# Patient Record
Sex: FEMALE | Race: BLACK OR AFRICAN AMERICAN | Employment: UNEMPLOYED | URBAN - METROPOLITAN AREA
[De-identification: names, ages, dates, MRNs, and addresses within clinical notes are randomized per-mention and may not be internally consistent; named-entity substitution may affect disease eponyms.]

---

## 2020-02-28 ENCOUNTER — HOSPITAL ENCOUNTER (EMERGENCY)
Facility: CLINIC | Age: 45
Discharge: HOME | End: 2020-02-28
Attending: EMERGENCY MEDICINE | Admitting: EMERGENCY MEDICINE
Payer: COMMERCIAL

## 2020-02-28 ENCOUNTER — APPOINTMENT (OUTPATIENT)
Dept: GENERAL RADIOLOGY | Facility: CLINIC | Age: 45
End: 2020-02-28
Attending: EMERGENCY MEDICINE
Payer: COMMERCIAL

## 2020-02-28 VITALS
TEMPERATURE: 97.9 F | HEIGHT: 66 IN | OXYGEN SATURATION: 99 % | WEIGHT: 208 LBS | BODY MASS INDEX: 33.43 KG/M2 | HEART RATE: 86 BPM | DIASTOLIC BLOOD PRESSURE: 94 MMHG | SYSTOLIC BLOOD PRESSURE: 122 MMHG

## 2020-02-28 DIAGNOSIS — S42.321A CLOSED DISPLACED TRANSVERSE FRACTURE OF SHAFT OF RIGHT HUMERUS, INITIAL ENCOUNTER: ICD-10-CM

## 2020-02-28 LAB — GLUCOSE BLDC GLUCOMTR-MCNC: 213 MG/DL (ref 70–99)

## 2020-02-28 PROCEDURE — 00000146 ZZHCL STATISTIC GLUCOSE BY METER IP

## 2020-02-28 PROCEDURE — 73060 X-RAY EXAM OF HUMERUS: CPT | Mod: RT

## 2020-02-28 PROCEDURE — 25000128 H RX IP 250 OP 636: Performed by: EMERGENCY MEDICINE

## 2020-02-28 PROCEDURE — 25000132 ZZH RX MED GY IP 250 OP 250 PS 637: Performed by: EMERGENCY MEDICINE

## 2020-02-28 PROCEDURE — 99284 EMERGENCY DEPT VISIT MOD MDM: CPT

## 2020-02-28 PROCEDURE — 68300005 ZZH TRAUMA EVALUATION W/O CC LEVEL III

## 2020-02-28 RX ORDER — MORPHINE SULFATE 15 MG/1
15-30 TABLET ORAL EVERY 6 HOURS PRN
Qty: 12 TABLET | Refills: 0 | Status: SHIPPED | OUTPATIENT
Start: 2020-02-28

## 2020-02-28 RX ORDER — ONDANSETRON 4 MG/1
4 TABLET, ORALLY DISINTEGRATING ORAL EVERY 6 HOURS PRN
Qty: 10 TABLET | Refills: 0 | Status: SHIPPED | OUTPATIENT
Start: 2020-02-28 | End: 2020-07-16

## 2020-02-28 RX ORDER — HYDROCODONE BITARTRATE AND ACETAMINOPHEN 5; 325 MG/1; MG/1
2 TABLET ORAL ONCE
Status: COMPLETED | OUTPATIENT
Start: 2020-02-28 | End: 2020-02-28

## 2020-02-28 RX ORDER — ONDANSETRON 4 MG/1
4 TABLET, ORALLY DISINTEGRATING ORAL ONCE
Status: COMPLETED | OUTPATIENT
Start: 2020-02-28 | End: 2020-02-28

## 2020-02-28 RX ADMIN — HYDROCODONE BITARTRATE AND ACETAMINOPHEN 2 TABLET: 5; 325 TABLET ORAL at 16:54

## 2020-02-28 RX ADMIN — ONDANSETRON 4 MG: 4 TABLET, ORALLY DISINTEGRATING ORAL at 16:54

## 2020-02-28 ASSESSMENT — ENCOUNTER SYMPTOMS: NAUSEA: 1

## 2020-02-28 ASSESSMENT — MIFFLIN-ST. JEOR: SCORE: 1610.23

## 2020-02-28 NOTE — ED AVS SNAPSHOT
Emergency Department  [address] [facility] [address]  Phone:  [phone]  Fax:  [phone]                                    Paula Moran   MRN: [id number]    Department:   Emergency Department   Date of Visit:  2/28/2020           After Visit Summary Signature Page    I have received my discharge instructions, and my questions have been answered. I have discussed any challenges I see with this plan with the nurse or doctor.    ..........................................................................................................................................  Patient/Patient Representative Signature      ..........................................................................................................................................  Patient Representative Print Name and Relationship to Patient    ..................................................               ................................................  Date                                   Time    ..........................................................................................................................................  Reviewed by Signature/Title    ...................................................              ..............................................  Date                                               Time          22EPIC Rev 08/18

## 2020-02-28 NOTE — DISCHARGE INSTRUCTIONS
Return to the emergency department or seek medical care as instructed if your symptoms fail to improve or significantly worsen.    Take Acetaminophen (aka Tylenol; 2 extra strength Tylenol every 6 hours as needed) and/or ibuprofen (aka Motrin/Advil, 600mg up to 4 times per day with food) as needed for symptom/pain relief; use as directed.    Can take immediate release morphine as needed for breakthrough pain; use as directed    Ice area of pain for 20 minutes four times per day for the next two days    Follow-up as indicated on page 1.  Maintain adequate hydration and get plenty of rest.

## 2020-02-28 NOTE — ED PROVIDER NOTES
"  History     Chief Complaint:  Fall    HPI   Paula Jorge Alberto Moran is a right hand dominant 45 year old female who presents after a fall. The patient tripped and fell, developing pain in her upper right arm. She did not hit her head or lose consciousness. Here, the patient also notes that she is nauseous.  Pain is significant worsened with palpation and movement.      Allergies:  No Known Allergies     Medications:    Lantus  Victoza  Fluoxetine  atorvastatin    Past Medical History:    Diabetes with neuropathy  Depression  Anxiety   GERD  Hyperlipidemia  Hypertension in pregnancy     Past Surgical History:    The patient does not have any pertinent past surgical history.     Family History:    No past pertinent family history.     Social History:  Tobacco use: negative   Alcohol use: negative   PCP: No primary care provider on file.     Review of Systems   Gastrointestinal: Positive for nausea.   Musculoskeletal:        Right arm pain    All other systems reviewed and are negative.    Physical Exam     Patient Vitals for the past 24 hrs:   BP Temp Temp src Pulse SpO2 Height Weight   02/28/20 1728 -- -- -- 68 99 % -- --   02/28/20 1659 (!) 153/100 97.9  F (36.6  C) Oral 83 99 % 1.676 m (5' 6\") 94.3 kg (208 lb)        Physical Exam  General: Patient in mild distress.  Alert and cooperative with exam. Normal mentation  HEENT: NC/AT. Conjunctiva without injection or scleral icterus. External ears normal.  Respiratory: Breathing comfortably on room air  CV: Normal rate, all extremities well perfused  GI:  Non-distended abdomen  Skin: Warm, dry, no rashes/open wounds on exposed skin  Musculoskeletal: RUE: CMS intact. She is bell her arm in a guarded position and notes greatest pain to mid-humerus with some overlying swelling. Compartments soft.   Neuro: Alert, answers questions appropriately. No gross motor deficits      Emergency Department Course     Imaging:  Radiology findings were communicated with the patient " who voiced understanding of the findings.    XR right humerus G/E 2 views:  There is a transverse fracture through the midshaft of the  right humerus with mild anterior displacement of the distal fragment.  No other significant displacement or angulation is seen, as per radiology.       Interventions:  1654 Norco 5-325 mg, 2 tablets PO   Zofran 4 mg PO ODT     Emergency Department Course:  Past medical records, nursing notes, and vitals reviewed.    1644 I performed an exam of the patient as documented above.     The patient was sent for a right humerus x-ray while in the emergency department, results above.     1728 Patient rechecked and updated.      1735 I consulted with Dr. Ortiz, ortho, regarding the patient's history and presentation here in the emergency department.     1739 Patient rechecked and updated.    1808 Patient rechecked and updated after brace was placed.     1818 Patient rechecked and updated.    I personally reviewed the imaging results with the Patient and answered all related questions prior to discharge. I prescribed morphine.      Impression & Plan     Medical Decision Making:  Paula Moran is a 45-year-old female presents with mid right humeral pain status post mechanical fall.  Patient's medical history and records were reviewed.  Initial consideration for, but not limited to, fracture, dislocation, soft tissue injury, among others.  Patient was provided Zofran and Lortab for pain and nausea control with noted improvement.  Imaging obtained and demonstrates transverse humerus fracture as noted above.  Case was briefly discussed with orthopedics who recommended Xiao brace and follow-up in clinic in ~1 week.  Patient was provided MSIR for breakthrough pain and discussed supportive care and return precautions.  Patient discharged home.  Remainder of trauma exam unremarkable.    Discharge Diagnosis:    ICD-10-CM    1. Closed displaced transverse fracture of shaft of right  humerus, initial encounter S42.321A      Disposition:  Discharged to home     Discharge Medications:  New Prescriptions    MORPHINE (MSIR) 15 MG IR TABLET    Take 1-2 tablets (15-30 mg) by mouth every 6 hours as needed for breakthrough pain       Scribe Disclosure:  I, Daphne Grover, am serving as a scribe at 4:44 PM on 2/28/2020 to document services personally performed by Matias Dorado DO based on my observations and the provider's statements to me.       Matias Dorado DO  02/29/20 0027

## 2020-06-24 ENCOUNTER — HOSPITAL ENCOUNTER (EMERGENCY)
Facility: CLINIC | Age: 45
Discharge: HOME | End: 2020-06-25
Attending: EMERGENCY MEDICINE | Admitting: EMERGENCY MEDICINE
Payer: COMMERCIAL

## 2020-06-24 ENCOUNTER — APPOINTMENT (OUTPATIENT)
Dept: GENERAL RADIOLOGY | Facility: CLINIC | Age: 45
End: 2020-06-24
Attending: EMERGENCY MEDICINE
Payer: COMMERCIAL

## 2020-06-24 DIAGNOSIS — R06.02 SHORTNESS OF BREATH: ICD-10-CM

## 2020-06-24 DIAGNOSIS — R07.9 CHEST PAIN, UNSPECIFIED TYPE: ICD-10-CM

## 2020-06-24 LAB
ALBUMIN SERPL-MCNC: 3.2 G/DL (ref 3.4–5)
ALP SERPL-CCNC: 112 U/L (ref 40–150)
ALT SERPL W P-5'-P-CCNC: 21 U/L (ref 0–50)
ANION GAP SERPL CALCULATED.3IONS-SCNC: 7 MMOL/L (ref 3–14)
AST SERPL W P-5'-P-CCNC: 16 U/L (ref 0–45)
BASOPHILS # BLD AUTO: 0 10E9/L (ref 0–0.2)
BASOPHILS NFR BLD AUTO: 0.1 %
BILIRUB SERPL-MCNC: 0.3 MG/DL (ref 0.2–1.3)
BUN SERPL-MCNC: 9 MG/DL (ref 7–30)
CALCIUM SERPL-MCNC: 9.2 MG/DL (ref 8.5–10.1)
CHLORIDE SERPL-SCNC: 105 MMOL/L (ref 94–109)
CO2 SERPL-SCNC: 25 MMOL/L (ref 20–32)
CREAT SERPL-MCNC: 0.45 MG/DL (ref 0.52–1.04)
D DIMER PPP FEU-MCNC: 0.3 UG/ML FEU (ref 0–0.5)
DIFFERENTIAL METHOD BLD: ABNORMAL
EOSINOPHIL # BLD AUTO: 0.2 10E9/L (ref 0–0.7)
EOSINOPHIL NFR BLD AUTO: 1.5 %
ERYTHROCYTE [DISTWIDTH] IN BLOOD BY AUTOMATED COUNT: 16.2 % (ref 10–15)
GFR SERPL CREATININE-BSD FRML MDRD: >90 ML/MIN/{1.73_M2}
GLUCOSE SERPL-MCNC: 120 MG/DL (ref 70–99)
HCT VFR BLD AUTO: 40.8 % (ref 35–47)
HGB BLD-MCNC: 13.3 G/DL (ref 11.7–15.7)
IMM GRANULOCYTES # BLD: 0 10E9/L (ref 0–0.4)
IMM GRANULOCYTES NFR BLD: 0.2 %
LYMPHOCYTES # BLD AUTO: 2.4 10E9/L (ref 0.8–5.3)
LYMPHOCYTES NFR BLD AUTO: 21.4 %
MCH RBC QN AUTO: 24.1 PG (ref 26.5–33)
MCHC RBC AUTO-ENTMCNC: 32.6 G/DL (ref 31.5–36.5)
MCV RBC AUTO: 74 FL (ref 78–100)
MONOCYTES # BLD AUTO: 0.6 10E9/L (ref 0–1.3)
MONOCYTES NFR BLD AUTO: 5.1 %
NEUTROPHILS # BLD AUTO: 7.9 10E9/L (ref 1.6–8.3)
NEUTROPHILS NFR BLD AUTO: 71.7 %
NRBC # BLD AUTO: 0 10*3/UL
NRBC BLD AUTO-RTO: 0 /100
NT-PROBNP SERPL-MCNC: 7 PG/ML (ref 0–450)
PLATELET # BLD AUTO: 276 10E9/L (ref 150–450)
POTASSIUM SERPL-SCNC: 3.4 MMOL/L (ref 3.4–5.3)
PROT SERPL-MCNC: 7.8 G/DL (ref 6.8–8.8)
RBC # BLD AUTO: 5.51 10E12/L (ref 3.8–5.2)
SODIUM SERPL-SCNC: 137 MMOL/L (ref 133–144)
TROPONIN I SERPL-MCNC: <0.015 UG/L (ref 0–0.04)
WBC # BLD AUTO: 11 10E9/L (ref 4–11)

## 2020-06-24 PROCEDURE — 85379 FIBRIN DEGRADATION QUANT: CPT | Performed by: EMERGENCY MEDICINE

## 2020-06-24 PROCEDURE — U0003 INFECTIOUS AGENT DETECTION BY NUCLEIC ACID (DNA OR RNA); SEVERE ACUTE RESPIRATORY SYNDROME CORONAVIRUS 2 (SARS-COV-2) (CORONAVIRUS DISEASE [COVID-19]), AMPLIFIED PROBE TECHNIQUE, MAKING USE OF HIGH THROUGHPUT TECHNOLOGIES AS DESCRIBED BY CMS-2020-01-R: HCPCS | Performed by: EMERGENCY MEDICINE

## 2020-06-24 PROCEDURE — 99285 EMERGENCY DEPT VISIT HI MDM: CPT | Mod: 25

## 2020-06-24 PROCEDURE — 84484 ASSAY OF TROPONIN QUANT: CPT | Performed by: EMERGENCY MEDICINE

## 2020-06-24 PROCEDURE — 71045 X-RAY EXAM CHEST 1 VIEW: CPT

## 2020-06-24 PROCEDURE — 83880 ASSAY OF NATRIURETIC PEPTIDE: CPT | Performed by: EMERGENCY MEDICINE

## 2020-06-24 PROCEDURE — 80053 COMPREHEN METABOLIC PANEL: CPT | Performed by: EMERGENCY MEDICINE

## 2020-06-24 PROCEDURE — 85025 COMPLETE CBC W/AUTO DIFF WBC: CPT | Performed by: EMERGENCY MEDICINE

## 2020-06-24 PROCEDURE — C9803 HOPD COVID-19 SPEC COLLECT: HCPCS

## 2020-06-24 PROCEDURE — 93005 ELECTROCARDIOGRAM TRACING: CPT

## 2020-06-24 SDOH — HEALTH STABILITY: MENTAL HEALTH: HOW OFTEN DO YOU HAVE A DRINK CONTAINING ALCOHOL?: NEVER

## 2020-06-24 ASSESSMENT — ENCOUNTER SYMPTOMS
NAUSEA: 1
DIARRHEA: 0
SHORTNESS OF BREATH: 1

## 2020-06-24 NOTE — LETTER
June 26, 2020        Paula Moran  [address] [facility] [id number]    This letter provides a written record that you were tested for COVID-19 on 6/24/20.       Your result was negative. This means that we didn t find the virus that causes COVID-19 in your sample. A test may show negative when you do actually have the virus. This can happen when the virus is in the early stages of infection, before you feel illness symptoms.    If you have symptoms   Stay home and away from others (self-isolate) until you meet ALL of the guidelines below:    You ve had no fever--and no medicine that reduces fever--for 3 full days (72 hours). And      Your other symptoms have gotten better. For example, your cough or breathing has improved. And     At least 10 days have passed since your symptoms started.    During this time:    Stay home. Don t go to work, school or anywhere else.     Stay in your own room, including for meals. Use your own bathroom if you can.    Stay away from others in your home. No hugging, kissing or shaking hands. No visitors.    Clean  high touch  surfaces often (doorknobs, counters, handles, etc.). Use a household cleaning spray or wipes. You can find a full list on the EPA website at www.epa.gov/pesticide-registration/list-n-disinfectants-use-against-sars-cov-2.    Cover your mouth and nose with a mask, tissue or washcloth to avoid spreading germs.    Wash your hands and face often with soap and water.    Going back to work  Check with your employer for any guidelines to follow for going back to work.    Employers: This document serves as formal notice that your employee tested negative for COVID-19, as of the testing date shown above.

## 2020-06-25 VITALS
OXYGEN SATURATION: 97 % | TEMPERATURE: 98.7 F | DIASTOLIC BLOOD PRESSURE: 81 MMHG | WEIGHT: 205 LBS | BODY MASS INDEX: 33.09 KG/M2 | RESPIRATION RATE: 16 BRPM | SYSTOLIC BLOOD PRESSURE: 128 MMHG | HEART RATE: 99 BPM

## 2020-06-25 LAB
SARS-COV-2 RNA SPEC QL NAA+PROBE: NOT DETECTED
SPECIMEN SOURCE: NORMAL

## 2020-06-25 NOTE — ED PROVIDER NOTES
History     Chief Complaint:  Chest Pain       HPI   Paula Moran is a 45 year old female with a history of poorly controlled type II diabetes and GERD who presents with shortness of breath and chest pain.  The patient states her symptoms have been present for the last 2 days.  She denies fever, cough, or sick contacts, and she has had no known COVID-19 exposures. The patient states that her pain has been present constantly since yesterday.  She describes it as sharp pain going through to her back.  It does not radiate anywhere else.  Nothing makes it worse or better.  The patient states her shortness of breath is worse with lying flat.  She denies taking hormones or birth control.  She denies smoking, personal or family history of PE or DVT, prolonged immobilization or travel.  Patient denies diarrhea.  She states she does have mild nausea as well.  Patient denies history of COPD, asthma, congestive heart failure, or chest pain episodes in the past.    Allergies:  NKDA     Medications:    Lantus  Novolog  Prozac  Jardiance  Victoza     Past Medical History:    Poorly controlled type II diabetes  Diabetic neuropathy  Hypertension in pregnancy  GERD  H. Pylori  Morbid obesity  Depression  Anemia    Past Surgical History:    The patient does not have any pertinent past surgical history.    Family History:    No past pertinent family history.    Social History:  Negative for tobacco use.  Negative for alcohol use.  Negative for drug use.    Marital Status:   [2]     Review of Systems   Respiratory: Positive for shortness of breath.    Cardiovascular: Positive for chest pain.   Gastrointestinal: Positive for nausea. Negative for diarrhea.   All other systems reviewed and are negative.        Physical Exam     Patient Vitals for the past 24 hrs:   BP Temp Temp src Pulse Heart Rate Resp SpO2 Weight   06/24/20 2300 -- -- -- -- 98 -- 97 % --   06/24/20 2200 -- -- -- -- 100 -- 98 % --   06/24/20 2115  131/88 -- -- -- 109 -- -- --   06/24/20 2114 131/88 98.7  F (37.1  C) Oral 109 -- 16 99 % 93 kg (205 lb)        Physical Exam  General: Patient is alert and normal appearing.  HEENT: Head atraumatic    Eyes: pupils equal and reactive. Conjunctiva clear   Nares: patent   Oropharynx: no lesions, uvula midline, no palatal draping, normal voice, no trismus  Neck: Supple without lymphadenopathy, no meningismus  Chest: Tachycardic but regular.   Lungs: Equal clear to auscultation with no wheeze or rales  Abdomen: Soft, non tender, nondistended, normal bowel sounds  Back: No costovertebral angle tenderness, no midline C, T or L spine tenderness  Neuro: Grossly nonfocal, normal speech, strength equal bilaterally, CN 2-12 intact  Extremities: No deformities, equal radial and DP pulses. No clubbing, cyanosis.  No edema  Skin: Warm and dry with no rash.       Emergency Department Course   ECG:  Indication: Chest pain  Time: 2108  Vent. Rate 117 bpm. VA interval 136. QRS duration 90. QT/QTc 330/460. P-R-T axis 40 22 14.  Sinus tachycardia. Otherwise normal ECG. Read time: 2117     Imaging:  Radiographic findings were communicated with the patient who voiced understanding of the findings.  XR Chest Portable 1 View:   Negative chest, as per radiology.      Laboratory:  CBC: WBC: 11.0, HGB: 13.3, PLT: 276  CMP: Glucose 120 (H), Creatinine 0.45 (L), Albumin 3.2 (L), o/w WNL  2131 Troponin: <0.015   D dimer: 0.3  BNP: 7    Emergency Department Course:  Past medical records, nursing notes, and vitals reviewed.     EKG obtained in the ED, see results above.     2111 I performed an exam of the patient as documented above.     IV inserted. Blood drawn. This was sent to the lab for further testing, results above.    The patient received a portable chest x-ray while in the emergency department, findings above.     2349 I rechecked the patient and discussed the results of her workup thus far. She told me that she was chest pain free and  her shortness of breath had resolved. We discussed the plan for discharge with an outpatient stress echocardiogram, and she felt comfortable going home.    Findings and plan explained to the Patient. Patient discharged home with instructions regarding supportive care, medications, and reasons to return. The importance of close follow-up was reviewed.     I personally reviewed the laboratory results with the Patient and answered all related questions prior to discharge.      Impression & Plan    Medical Decision Making:  Paula Moran is a 45 year old female who presents for evaluation of chest pain.  This pain has lasted for over 12 hours now. Initial laboratory and imaging tests have come back normal.  There has been no progression of symptoms or other new concerning symptoms.   Symptoms have completely resolved at this time.    A broad differential was of course considered.  Exceedingly low risk for unstable angina at this point and will therefore not admit formally and administer heparin.    Discussed HEART score with patient and shared decision making regarding disposition and further workup was done.      I have ordered the patient up for an outpatient stress echocardiogram within 72 hours.  The patient agrees with the plan and all questions were answered.    Patient symptoms may be representative COVID-19.  Swab was sent.  Patient understands she needs to isolate until which time it results.    Covid-19  Paula Moran was evaluated during a global COVID-19 pandemic, which necessitated consideration that the patient might be at risk for infection with the SARS-CoV-2 virus that causes COVID-19.   Applicable protocols for evaluation were followed during the patient's care.   COVID-19 was considered as part of the patient's evaluation. The plan for testing is:  a test was obtained during this visit.    Diagnosis:    ICD-10-CM    1. Chest pain, unspecified type  R07.9 Follow-Up with Cardiologist      Exercise Stress Echocardiogram   2. Shortness of breath  R06.02        Disposition:  discharged to home      Scribe Disclosure:  I, Vicki Nieves, am serving as a scribe on 6/24/2020 at 9:11 PM to personally document services performed by Lucy Grullon MD based on my observations and the provider's statements to me.      Vicki Nieves  6/24/2020    EMERGENCY DEPARTMENT       Lucy Grullon MD  06/25/20 0041

## 2020-07-01 ENCOUNTER — VIRTUAL VISIT (OUTPATIENT)
Dept: CARDIOLOGY | Facility: CLINIC | Age: 45
End: 2020-07-01
Attending: EMERGENCY MEDICINE
Payer: COMMERCIAL

## 2020-07-01 ENCOUNTER — HOSPITAL ENCOUNTER (OUTPATIENT)
Dept: CARDIOLOGY | Facility: CLINIC | Age: 45
Discharge: HOME | End: 2020-07-01
Attending: EMERGENCY MEDICINE | Admitting: EMERGENCY MEDICINE
Payer: COMMERCIAL

## 2020-07-01 DIAGNOSIS — R07.9 CHEST PAIN, UNSPECIFIED TYPE: ICD-10-CM

## 2020-07-01 DIAGNOSIS — G47.33 OSA (OBSTRUCTIVE SLEEP APNEA): Primary | ICD-10-CM

## 2020-07-01 DIAGNOSIS — R06.09 OTHER FORM OF DYSPNEA: ICD-10-CM

## 2020-07-01 PROCEDURE — 93321 DOPPLER ECHO F-UP/LMTD STD: CPT | Mod: 26 | Performed by: INTERNAL MEDICINE

## 2020-07-01 PROCEDURE — 99203 OFFICE O/P NEW LOW 30 MIN: CPT | Mod: GT | Performed by: INTERNAL MEDICINE

## 2020-07-01 PROCEDURE — 93018 CV STRESS TEST I&R ONLY: CPT | Performed by: INTERNAL MEDICINE

## 2020-07-01 PROCEDURE — 93350 STRESS TTE ONLY: CPT | Mod: 26 | Performed by: INTERNAL MEDICINE

## 2020-07-01 PROCEDURE — 25500064 ZZH RX 255 OP 636: Performed by: EMERGENCY MEDICINE

## 2020-07-01 PROCEDURE — 93325 DOPPLER ECHO COLOR FLOW MAPG: CPT | Mod: 26 | Performed by: INTERNAL MEDICINE

## 2020-07-01 PROCEDURE — 93325 DOPPLER ECHO COLOR FLOW MAPG: CPT | Mod: TC

## 2020-07-01 PROCEDURE — 93016 CV STRESS TEST SUPVJ ONLY: CPT | Performed by: INTERNAL MEDICINE

## 2020-07-01 RX ORDER — LIRAGLUTIDE 6 MG/ML
1.8 INJECTION SUBCUTANEOUS DAILY
COMMUNITY

## 2020-07-01 RX ORDER — INSULIN GLARGINE 100 [IU]/ML
INJECTION, SOLUTION SUBCUTANEOUS AT BEDTIME
COMMUNITY

## 2020-07-01 RX ADMIN — HUMAN ALBUMIN MICROSPHERES AND PERFLUTREN 3 ML: 10; .22 INJECTION, SOLUTION INTRAVENOUS at 09:30

## 2020-07-01 NOTE — PROGRESS NOTES
"Paula Moran is a 45 year old female who is being evaluated via a billable video visit.      The patient has been notified of following:     \"This video visit will be conducted via a call between you and your physician/provider. We have found that certain health care needs can be provided without the need for an in-person physical exam.  This service lets us provide the care you need with a video conversation.  If a prescription is necessary we can send it directly to your pharmacy.  If lab work is needed we can place an order for that and you can then stop by our lab to have the test done at a later time.    Video visits are billed at different rates depending on your insurance coverage.  Please reach out to your insurance provider with any questions.    If during the course of the call the physician/provider feels a video visit is not appropriate, you will not be charged for this service.\"    Patient has given verbal consent for Video visit? Yes  How would you like to obtain your AVS? Mail a copy  Patient would like the video invitation sent by: Text to cell phone: [phone]  Will anyone else be joining your video visit? No      Review Of Systems  Skin: Negative  Eyes: Positive for glasses  Ears/Nose/Throat: Negative  Respiratory: Positive for dyspnea  Cardiovascular: Negative  Gastrointestinal: Negative  Genitourinary: Positive for nocturia  Musculoskeletal: Negative  Neurologic: Negative  Psychiatric: Negative  Hematologic/Lymphatic/Immunologic: Negative  Endocrine: Positive for diabetes    Patient reported vitals:  BP: N/A  Heart rate: N/A  Weight: N/A    Jenna Olivarez CMA    HISTORY:    Paula Moran is a 45-year-old female with a history of type 2 diabetes, hyperlipidemia, morbid obesity, and and recent onset of dyspnea and chest pain.  She was asked to follow-up with me after a recent ER visit for these complaints.    Paula presented to [facility] emergency room on June 24 " "complaining of shortness of breath and chest pain.  This had been bothering her for 2 days.  She was examined and found to have no identifiable problems, COVID-19 tests were negative, and her cardiac troponin and proBNP were normal.  She did not have ischemic changes on ECG.  A stress echo was ordered and she was discharged from the emergency room.    The stress echo was done this morning and it has not yet been formally read at the time of my visit, but I reviewed the images and it appears completely normal.    Today Paula reports that she has frequent episodes of difficulty breathing with a sense of suffocating.  She states that this comes and goes and generally resolves within less than 2 minutes.  She does not recall ever having this prior to about 2 weeks ago.  The episodes occur mostly at night and although she states that they also occur in the daytime it sounds like it is only occurs while she is napping, although despite several efforts I was unable to get her to confirm that.  She commented that her family has found her \"breathing wrong\" and have commented that she sounds horrible with her mouth wide open and gasping for breath.  She also is a very loud snorer and reports that her family think she stops breathing and that when she wakes up in the morning she is not refreshed.    Except for these brief episodes of dyspnea, Paula reports that she thinks that she breathes normally.  When she first goes to bed she can lie down flat without dyspnea and wakes up several hours later gasping for breath.  She also is able to walk up a flight of steps and move around the house without significant dyspnea.  Upon her presentation to the emergency room she was having a lot of chest pain but she reports that this has now completely resolved and she has not had this in several days.  She reports that she feels that her sinuses are plugged, but she does not have significant drainage.    Paula has not had problems with " exertional chest, arm, neck, or jaw discomfort nor has she had any problems with palpitations.  She has not had any syncope or near syncope.  She does not suffer from significant lower extremity edema or symptoms of claudication.      Physical Exam:    Constitutional: Appears relaxed and comfortable, obese  Eyes: Normal in appearance.  Conjugate eye movements without nystagmus  Respiratory:  Breathing comfortably without apparent dyspnea or audible wheezes  Musculoskeletal:  Smooth upper extremity movements without visible tremor  Skin:  No visible lesions, discoloration or cyanosis  Neurological: Normal speech, no facial droop or nystagmus, normal thought process'  Psychiatric:  alert and orientated, does not seem anxious.    The rest of a comprehensive physical examination is deferred due to [facility] (public health emergency) video visit restrictions.       ASSESSMENT/PLAN:    1.  Very likely obstructive sleep apnea.  The patient has brief episodes of a sense of suffocating and it sounds like this only occurs when she is sleeping although it can be daytime, she reports, but although she states she is awake she also tells me that when this happens her family shakes her to wake her up.  In any case her symptoms sound extremely likely to represent sleep apnea, almost certainly longstanding but for some reason now bothering her.  It is reassuring that she does not feel breathless most of the time with activities, and that her stress echo looks completely normal.  I will arrange a sleep study for her and she will follow-up with her primary care physician after that.  2.  Type 2 diabetes with hyperlipidemia.  The patient reports to me that she is using simvastatin.  3.  Hypertension with pregnancy.  Current blood pressure is marginally elevated, diastolic values are upper 80s.  Her previous problem with pregnancy related hypertension places her at additional long term cardiovascular risk.    Thank you for  Inviting me to  participate in your patient's care.  I do not believe she has any cardiovascular problems at this point, but I will initiate a sleep  Study to diagnose and start therapy for her ANDREW.  Follow up as needed.    Video-Visit Details    Type of service:  Video Visit    Video Start Time: 12:19 PM  Video End Time: 12:40 PM    Originating Location (pt. Location): Home    Distant Location (provider location):  [facility]     Platform used for Video Visit: Rose Wolfe MD

## 2020-07-01 NOTE — LETTER
"7/1/2020    [facility]  [address]  [facility] [address]    RE: Paula Moran       Dear Colleague,    I had the pleasure of seeing Paula Moran in the [facility].    Paula Moran is a 45 year old female who is being evaluated via a billable video visit.      HISTORY:    Paula Moran is a 45-year-old female with a history of type 2 diabetes, hyperlipidemia, morbid obesity, and and recent onset of dyspnea and chest pain.  She was asked to follow-up with me after a recent ER visit for these complaints.    Paula presented to [facility] emergency room on June 24 complaining of shortness of breath and chest pain.  This had been bothering her for 2 days.  She was examined and found to have no identifiable problems, COVID-19 tests were negative, and her cardiac troponin and proBNP were normal.  She did not have ischemic changes on ECG.  A stress echo was ordered and she was discharged from the emergency room.    The stress echo was done this morning and it has not yet been formally read at the time of my visit, but I reviewed the images and it appears completely normal.    Today Paula reports that she has frequent episodes of difficulty breathing with a sense of suffocating.  She states that this comes and goes and generally resolves within less than 2 minutes.  She does not recall ever having this prior to about 2 weeks ago.  The episodes occur mostly at night and although she states that they also occur in the daytime it sounds like it is only occurs while she is napping, although despite several efforts I was unable to get her to confirm that.  She commented that her family has found her \"breathing wrong\" and have commented that she sounds horrible with her mouth wide open and gasping for breath.  She also is a very loud snorer and reports that her family think she stops breathing and that when she wakes up in " the morning she is not refreshed.    Except for these brief episodes of dyspnea, Paula reports that she thinks that she breathes normally.  When she first goes to bed she can lie down flat without dyspnea and wakes up several hours later gasping for breath.  She also is able to walk up a flight of steps and move around the house without significant dyspnea.  Upon her presentation to the emergency room she was having a lot of chest pain but she reports that this has now completely resolved and she has not had this in several days.  She reports that she feels that her sinuses are plugged, but she does not have significant drainage.    Paula has not had problems with exertional chest, arm, neck, or jaw discomfort nor has she had any problems with palpitations.  She has not had any syncope or near syncope.  She does not suffer from significant lower extremity edema or symptoms of claudication.      Physical Exam:    Constitutional: Appears relaxed and comfortable, obese  Eyes: Normal in appearance.  Conjugate eye movements without nystagmus  Respiratory:  Breathing comfortably without apparent dyspnea or audible wheezes  Musculoskeletal:  Smooth upper extremity movements without visible tremor  Skin:  No visible lesions, discoloration or cyanosis  Neurological: Normal speech, no facial droop or nystagmus, normal thought process'  Psychiatric:  alert and orientated, does not seem anxious.    The rest of a comprehensive physical examination is deferred due to [facility] (public health emergency) video visit restrictions.       ASSESSMENT/PLAN:    1.  Very likely obstructive sleep apnea.  The patient has brief episodes of a sense of suffocating and it sounds like this only occurs when she is sleeping although it can be daytime, she reports, but although she states she is awake she also tells me that when this happens her family shakes her to wake her up.  In any case her symptoms sound extremely likely to represent sleep apnea,  almost certainly longstanding but for some reason now bothering her.  It is reassuring that she does not feel breathless most of the time with activities, and that her stress echo looks completely normal.  I will arrange a sleep study for her and she will follow-up with her primary care physician after that.  2.  Type 2 diabetes with hyperlipidemia.  The patient reports to me that she is using simvastatin.  3.  Hypertension with pregnancy.  Current blood pressure is marginally elevated, diastolic values are upper 80s.  Her previous problem with pregnancy related hypertension places her at additional long term cardiovascular risk.    Thank you for  Inviting me to participate in your patient's care.  I do not believe she has any cardiovascular problems at this point, but I will initiate a sleep  Study to diagnose and start therapy for her ANDREW.  Follow up as needed.      Thank you for allowing me to participate in the care of your patient.    Sincerely,     Roc Wolfe MD     [facility]    cc:   Lucy Grullon MD  [facility]  [address]  [place] [address]

## 2020-07-15 ENCOUNTER — MYC MEDICAL ADVICE (OUTPATIENT)
Dept: SLEEP MEDICINE | Facility: CLINIC | Age: 45
End: 2020-07-15

## 2020-07-16 VITALS — WEIGHT: 211 LBS | HEIGHT: 66 IN | BODY MASS INDEX: 33.91 KG/M2

## 2020-07-16 ASSESSMENT — MIFFLIN-ST. JEOR: SCORE: 1623.84

## 2020-07-16 NOTE — PATIENT INSTRUCTIONS
Your BMI is Body mass index is 34.06 kg/m .  Weight management is a personal decision.  If you are interested in exploring weight loss strategies, the following discussion covers the approaches that may be successful. Body mass index (BMI) is one way to tell whether you are at a healthy weight, overweight, or obese. It measures your weight in relation to your height.  A BMI of 18.5 to 24.9 is in the healthy range. A person with a BMI of 25 to 29.9 is considered overweight, and someone with a BMI of 30 or greater is considered obese. More than two-thirds of American adults are considered overweight or obese.  Being overweight or obese increases the risk for further weight gain. Excess weight may lead to heart disease and diabetes.  Creating and following plans for healthy eating and physical activity may help you improve your health.  Weight control is part of healthy lifestyle and includes exercise, emotional health, and healthy eating habits. Careful eating habits lifelong are the mainstay of weight control. Though there are significant health benefits from weight loss, long-term weight loss with diet alone may be very difficult to achieve- studies show long-term success with dietary management in less than 10% of people. Attaining a healthy weight may be especially difficult to achieve in those with severe obesity. In some cases, medications, devices and surgical management might be considered.  What can you do?  If you are overweight or obese and are interested in methods for weight loss, you should discuss this with your provider.     Consider reducing daily calorie intake by 500 calories.     Keep a food journal.     Avoiding skipping meals, consider cutting portions instead.    Diet combined with exercise helps maintain muscle while optimizing fat loss. Strength training is particularly important for building and maintaining muscle mass. Exercise helps reduce stress, increase energy, and improves fitness.  Increasing exercise without diet control, however, may not burn enough calories to loose weight.       Start walking three days a week 10-20 minutes at a time    Work towards walking thirty minutes five days a week     Eventually, increase the speed of your walking for 1-2 minutes at time    In addition, we recommend that you review healthy lifestyles and methods for weight loss available through the National [employer] of Health patient information sites:  http://win.niddk.nih.gov/publications/index.htm    And look into health and wellness programs that may be available through your health insurance provider, employer, local community center, or dylon club.    Weight management plan: Patient was referred to their PCP to discuss a diet and exercise plan.

## 2020-07-17 ENCOUNTER — VIRTUAL VISIT (OUTPATIENT)
Dept: SLEEP MEDICINE | Facility: CLINIC | Age: 45
End: 2020-07-17
Payer: COMMERCIAL

## 2020-07-17 DIAGNOSIS — G47.33 OSA (OBSTRUCTIVE SLEEP APNEA): ICD-10-CM

## 2020-07-17 DIAGNOSIS — R06.09 OTHER FORM OF DYSPNEA: ICD-10-CM

## 2020-07-17 PROBLEM — E66.01 MORBID OBESITY WITH BMI OF 45.0-49.9, ADULT (H): Status: ACTIVE | Noted: 2020-07-17

## 2020-07-17 PROBLEM — O16.9 HYPERTENSION IN PREGNANCY: Status: ACTIVE | Noted: 2020-07-17

## 2020-07-17 PROBLEM — F41.9 ANXIETY: Status: ACTIVE | Noted: 2017-06-08

## 2020-07-17 PROBLEM — D50.0 IRON DEFICIENCY ANEMIA DUE TO CHRONIC BLOOD LOSS: Status: ACTIVE | Noted: 2020-07-17

## 2020-07-17 PROBLEM — Z79.4 TYPE 2 DIABETES MELLITUS WITH DIABETIC NEUROPATHY, WITH LONG-TERM CURRENT USE OF INSULIN (H): Status: ACTIVE | Noted: 2017-04-28

## 2020-07-17 PROBLEM — E11.40 TYPE 2 DIABETES MELLITUS WITH DIABETIC NEUROPATHY, WITH LONG-TERM CURRENT USE OF INSULIN (H): Status: ACTIVE | Noted: 2017-04-28

## 2020-07-17 PROBLEM — F33.42 RECURRENT MAJOR DEPRESSIVE DISORDER, IN FULL REMISSION (H): Status: ACTIVE | Noted: 2017-06-08

## 2020-07-17 RX ORDER — ATORVASTATIN CALCIUM 20 MG/1
20 TABLET, FILM COATED ORAL DAILY
COMMUNITY
Start: 2019-12-28

## 2021-01-15 ENCOUNTER — HEALTH MAINTENANCE LETTER (OUTPATIENT)
Age: 46
End: 2021-01-15

## 2021-01-23 ENCOUNTER — HEALTH MAINTENANCE LETTER (OUTPATIENT)
Age: 46
End: 2021-01-23

## 2021-05-15 ENCOUNTER — HEALTH MAINTENANCE LETTER (OUTPATIENT)
Age: 46
End: 2021-05-15

## 2021-09-04 ENCOUNTER — HEALTH MAINTENANCE LETTER (OUTPATIENT)
Age: 46
End: 2021-09-04

## 2021-12-25 ENCOUNTER — HEALTH MAINTENANCE LETTER (OUTPATIENT)
Age: 46
End: 2021-12-25

## 2022-02-19 ENCOUNTER — HEALTH MAINTENANCE LETTER (OUTPATIENT)
Age: 47
End: 2022-02-19

## 2022-04-16 ENCOUNTER — HEALTH MAINTENANCE LETTER (OUTPATIENT)
Age: 47
End: 2022-04-16

## 2022-08-06 ENCOUNTER — HEALTH MAINTENANCE LETTER (OUTPATIENT)
Age: 47
End: 2022-08-06

## 2022-10-22 ENCOUNTER — HEALTH MAINTENANCE LETTER (OUTPATIENT)
Age: 47
End: 2022-10-22

## 2022-12-04 ENCOUNTER — HEALTH MAINTENANCE LETTER (OUTPATIENT)
Age: 47
End: 2022-12-04

## 2023-04-01 ENCOUNTER — HEALTH MAINTENANCE LETTER (OUTPATIENT)
Age: 48
End: 2023-04-01

## 2023-08-26 ENCOUNTER — HEALTH MAINTENANCE LETTER (OUTPATIENT)
Age: 48
End: 2023-08-26

## 2024-01-13 ENCOUNTER — HEALTH MAINTENANCE LETTER (OUTPATIENT)
Age: 49
End: 2024-01-13

## 2025-08-29 ENCOUNTER — APPOINTMENT (OUTPATIENT)
Dept: GENERAL RADIOLOGY | Facility: CLINIC | Age: 50
End: 2025-08-29
Attending: EMERGENCY MEDICINE
Payer: COMMERCIAL

## 2025-08-29 ENCOUNTER — HOSPITAL ENCOUNTER (EMERGENCY)
Facility: CLINIC | Age: 50
Discharge: HOME | End: 2025-08-29
Attending: EMERGENCY MEDICINE | Admitting: EMERGENCY MEDICINE
Payer: COMMERCIAL

## 2025-08-29 VITALS
WEIGHT: 188 LBS | DIASTOLIC BLOOD PRESSURE: 75 MMHG | BODY MASS INDEX: 30.22 KG/M2 | HEIGHT: 66 IN | OXYGEN SATURATION: 100 % | TEMPERATURE: 97.1 F | HEART RATE: 79 BPM | SYSTOLIC BLOOD PRESSURE: 122 MMHG | RESPIRATION RATE: 16 BRPM

## 2025-08-29 DIAGNOSIS — R07.9 CHEST PAIN, UNSPECIFIED TYPE: Primary | ICD-10-CM

## 2025-08-29 LAB
ANION GAP SERPL CALCULATED.3IONS-SCNC: 12 MMOL/L (ref 7–15)
ATRIAL RATE - MUSE: 85 BPM
BASOPHILS # BLD AUTO: 0.03 10E3/UL (ref 0–0.2)
BASOPHILS NFR BLD AUTO: 0.3 %
BUN SERPL-MCNC: 13.4 MG/DL (ref 6–20)
CALCIUM SERPL-MCNC: 9.6 MG/DL (ref 8.8–10.4)
CHLORIDE SERPL-SCNC: 101 MMOL/L (ref 98–107)
CREAT SERPL-MCNC: 0.49 MG/DL (ref 0.51–0.95)
DIASTOLIC BLOOD PRESSURE - MUSE: NORMAL MMHG
EGFRCR SERPLBLD CKD-EPI 2021: >90 ML/MIN/1.73M2
EOSINOPHIL # BLD AUTO: 0.47 10E3/UL (ref 0–0.7)
EOSINOPHIL NFR BLD AUTO: 5.3 %
ERYTHROCYTE [DISTWIDTH] IN BLOOD BY AUTOMATED COUNT: 15.3 % (ref 10–15)
GLUCOSE SERPL-MCNC: 228 MG/DL (ref 70–99)
HCO3 SERPL-SCNC: 23 MMOL/L (ref 22–29)
HCT VFR BLD AUTO: 39.9 % (ref 35–47)
HGB BLD-MCNC: 12.9 G/DL (ref 11.7–15.7)
HOLD SPECIMEN: NORMAL
HOLD SPECIMEN: NORMAL
IMM GRANULOCYTES # BLD: <0.03 10E3/UL
IMM GRANULOCYTES NFR BLD: 0.2 %
INTERPRETATION ECG - MUSE: NORMAL
LYMPHOCYTES # BLD AUTO: 2.18 10E3/UL (ref 0.8–5.3)
LYMPHOCYTES NFR BLD AUTO: 24.8 %
MCH RBC QN AUTO: 24.5 PG (ref 26.5–33)
MCHC RBC AUTO-ENTMCNC: 32.3 G/DL (ref 31.5–36.5)
MCV RBC AUTO: 75.9 FL (ref 78–100)
MONOCYTES # BLD AUTO: 0.56 10E3/UL (ref 0–1.3)
MONOCYTES NFR BLD AUTO: 6.4 %
NEUTROPHILS # BLD AUTO: 5.53 10E3/UL (ref 1.6–8.3)
NEUTROPHILS NFR BLD AUTO: 63 %
NRBC # BLD AUTO: <0.03 10E3/UL
NRBC BLD AUTO-RTO: 0 /100
P AXIS - MUSE: 46 DEGREES
PLATELET # BLD AUTO: 296 10E3/UL (ref 150–450)
POTASSIUM SERPL-SCNC: 4.2 MMOL/L (ref 3.4–5.3)
PR INTERVAL - MUSE: 144 MS
QRS DURATION - MUSE: 86 MS
QT - MUSE: 372 MS
QTC - MUSE: 442 MS
R AXIS - MUSE: 9 DEGREES
RBC # BLD AUTO: 5.26 10E6/UL (ref 3.8–5.2)
SODIUM SERPL-SCNC: 136 MMOL/L (ref 135–145)
SYSTOLIC BLOOD PRESSURE - MUSE: NORMAL MMHG
T AXIS - MUSE: 35 DEGREES
TROPONIN T SERPL HS-MCNC: <6 NG/L
VENTRICULAR RATE- MUSE: 85 BPM
WBC # BLD AUTO: 8.79 10E3/UL (ref 4–11)

## 2025-08-29 PROCEDURE — 85025 COMPLETE CBC W/AUTO DIFF WBC: CPT | Performed by: EMERGENCY MEDICINE

## 2025-08-29 PROCEDURE — 99285 EMERGENCY DEPT VISIT HI MDM: CPT | Mod: 25 | Performed by: EMERGENCY MEDICINE

## 2025-08-29 PROCEDURE — 93005 ELECTROCARDIOGRAM TRACING: CPT

## 2025-08-29 PROCEDURE — 84484 ASSAY OF TROPONIN QUANT: CPT | Performed by: STUDENT IN AN ORGANIZED HEALTH CARE EDUCATION/TRAINING PROGRAM

## 2025-08-29 PROCEDURE — 85018 HEMOGLOBIN: CPT | Performed by: STUDENT IN AN ORGANIZED HEALTH CARE EDUCATION/TRAINING PROGRAM

## 2025-08-29 PROCEDURE — 71046 X-RAY EXAM CHEST 2 VIEWS: CPT

## 2025-08-29 PROCEDURE — 36415 COLL VENOUS BLD VENIPUNCTURE: CPT | Performed by: STUDENT IN AN ORGANIZED HEALTH CARE EDUCATION/TRAINING PROGRAM

## 2025-08-29 PROCEDURE — 80048 BASIC METABOLIC PNL TOTAL CA: CPT | Performed by: EMERGENCY MEDICINE

## 2025-08-29 PROCEDURE — 82310 ASSAY OF CALCIUM: CPT | Performed by: STUDENT IN AN ORGANIZED HEALTH CARE EDUCATION/TRAINING PROGRAM

## 2025-08-29 PROCEDURE — 84484 ASSAY OF TROPONIN QUANT: CPT | Performed by: EMERGENCY MEDICINE

## 2025-08-29 RX ORDER — HYDROXYZINE HYDROCHLORIDE 25 MG/1
25-50 TABLET, FILM COATED ORAL 3 TIMES DAILY PRN
Qty: 30 TABLET | Refills: 0 | Status: SHIPPED | OUTPATIENT
Start: 2025-08-29

## 2025-08-29 ASSESSMENT — ACTIVITIES OF DAILY LIVING (ADL)
ADLS_ACUITY_SCORE: 41

## 2025-08-29 ASSESSMENT — COLUMBIA-SUICIDE SEVERITY RATING SCALE - C-SSRS
2. HAVE YOU ACTUALLY HAD ANY THOUGHTS OF KILLING YOURSELF IN THE PAST MONTH?: NO
1. IN THE PAST MONTH, HAVE YOU WISHED YOU WERE DEAD OR WISHED YOU COULD GO TO SLEEP AND NOT WAKE UP?: NO
6. HAVE YOU EVER DONE ANYTHING, STARTED TO DO ANYTHING, OR PREPARED TO DO ANYTHING TO END YOUR LIFE?: NO